# Patient Record
Sex: FEMALE
[De-identification: names, ages, dates, MRNs, and addresses within clinical notes are randomized per-mention and may not be internally consistent; named-entity substitution may affect disease eponyms.]

---

## 2018-03-02 ENCOUNTER — HOSPITAL ENCOUNTER (OUTPATIENT)
Dept: HOSPITAL 5 - SPVIMAG | Age: 28
Discharge: HOME | End: 2018-03-02
Attending: ORTHOPAEDIC SURGERY
Payer: COMMERCIAL

## 2018-03-02 DIAGNOSIS — M19.011: Primary | ICD-10-CM

## 2018-03-02 NOTE — XRAY REPORT
XRAY RIGHT SHOULDER THREE VIEWS: 03/02/18 08:58:00



CLINICAL: Right shoulder pain.



FINDINGS: Normal glenohumeral alignment.  Mild irregularity and 

eburnation of the glenoid rim  Normal AC joint.  No fracture or 

dislocation. No bone lesion.  Normal soft tissues.



IMPRESSION: Mild glenohumeral joint arthritis.